# Patient Record
Sex: MALE | Race: WHITE
[De-identification: names, ages, dates, MRNs, and addresses within clinical notes are randomized per-mention and may not be internally consistent; named-entity substitution may affect disease eponyms.]

---

## 2017-02-13 ENCOUNTER — HOSPITAL ENCOUNTER (OUTPATIENT)
Dept: HOSPITAL 45 - C.RAD1850 | Age: 52
Discharge: HOME | End: 2017-02-13
Attending: FAMILY MEDICINE
Payer: COMMERCIAL

## 2017-02-13 DIAGNOSIS — M19.90: Primary | ICD-10-CM

## 2017-02-13 NOTE — DIAGNOSTIC IMAGING REPORT
RIGHT HIP 2 VIEWS



HISTORY:      Right hip pain.



COMPARISON: Right hip 3/12/2015.



FINDINGS: There is no fracture or dislocation. Soft tissues are unremarkable.

The visualized pelvic bones are intact. Cartilage spaces maintained. Minimal

osteophytosis at the right hip. This is also unchanged.



IMPRESSION:  

No fractures. No change in the minimal osteoarthritis.







Electronically signed by:  Jonah Barrientos M.D.

2/13/2017 5:03 PM



Dictated Date/Time:  2/13/2017 5:01 PM

## 2017-02-27 ENCOUNTER — HOSPITAL ENCOUNTER (OUTPATIENT)
Dept: HOSPITAL 45 - C.RAD | Age: 52
Discharge: HOME | End: 2017-02-27
Attending: INTERNAL MEDICINE
Payer: COMMERCIAL

## 2017-02-27 DIAGNOSIS — R07.9: Primary | ICD-10-CM

## 2017-02-27 NOTE — DIAGNOSTIC IMAGING REPORT
CHEST 2 VIEWS ROUTINE



CLINICAL HISTORY: Atypical chest pain    



COMPARISON STUDY:  5/5/2014



FINDINGS: The cardiac and mediastinal contours are normal. There is no evidence

of focal pulmonary consolidation. There is no evidence of failure. No pleural

effusions are visualized.[ 



IMPRESSION: No active disease in the chest.







Electronically signed by:  Octaviano Estrada M.D.

2/27/2017 2:50 PM



Dictated Date/Time:  2/27/2017 2:50 PM

## 2017-03-01 ENCOUNTER — HOSPITAL ENCOUNTER (OUTPATIENT)
Dept: HOSPITAL 45 - C.CTS | Age: 52
Discharge: HOME | End: 2017-03-01
Attending: FAMILY MEDICINE
Payer: COMMERCIAL

## 2017-03-01 DIAGNOSIS — M19.90: ICD-10-CM

## 2017-03-01 DIAGNOSIS — I77.6: Primary | ICD-10-CM

## 2017-03-01 DIAGNOSIS — N20.0: ICD-10-CM

## 2017-03-01 DIAGNOSIS — I74.8: ICD-10-CM

## 2017-03-01 NOTE — DIAGNOSTIC IMAGING REPORT
CT abdominal angiogram ANGIO ABDOMEN WITH CONTRAST



CLINICAL HISTORY: HX OF SPLENIC ARTERY THROMBOSIS celiac axis thrombosis



TECHNIQUE: Transaxial acquisition with multi axial reformatted images



COMPARISON STUDY:  MRA abdomen dated 11/12/2015 CT dated 10/26/2015



FINDINGS: No change overall compared to the prior studies. Occlusion of the

celiac axis several centimeters from its origin. At this point there is

aneurysmal distention of approximately 8 x 7 mm with a mild surrounding soft

tissue process. These all appear to be similar compared to the prior study.

Occlusion of the hepatic and splenic arteries is again noted. There again is

collateral distal reconstitution. Overall appearance is similar compared to the

prior study.



Small splenic infarct upper aspect of the spleen is unchanged. There are no new

or interval findings. Abdominal aorta is normal in course and caliber.



IMPRESSION:  

1. Stable CTA of the abdomen compared to leads to prior studies.





2. Occlusion distal celiac axis, proximal common hepatic artery, and splenic

artery with distal collateral reconstitution

3. Kidneys currently are unremarkable. Several small parapelvic cysts are

present.

4. 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

5. Nonobstructive bowel pattern. 









Electronically signed by:  Luciano Bonilla M.D.

3/1/2017 7:52 AM



Dictated Date/Time:  3/1/2017 7:39 AM

## 2017-04-28 ENCOUNTER — HOSPITAL ENCOUNTER (OUTPATIENT)
Dept: HOSPITAL 45 - C.MRIBC | Age: 52
Discharge: HOME | End: 2017-04-28
Attending: ORTHOPAEDIC SURGERY
Payer: COMMERCIAL

## 2017-04-28 DIAGNOSIS — M87.351: Primary | ICD-10-CM

## 2017-04-28 NOTE — DIAGNOSTIC IMAGING REPORT
POST ARTHROGRAM MRI THE RIGHT HIP



CLINICAL HISTORY: Right hip pain unresponsive to physical therapy and cortisone

injection.



COMPARISON STUDY:  Conventional radiographic study dated 2/13/2017



FINDINGS: There are no areas of marrow edema to indicate neoplasm. There is a

serpiginous focus of increased T2 signal within the superior cortex of the

femoral head consistent with avascular necrosis. There is no evidence of

chondral collapse.



There is no evidence of labral tear.



There is no evidence of occult fracture.



On the  images of the pelvis, there is equivocal AVN of the left hip as

well.



IMPRESSION:  Avascular necrosis of the femoral head. No evidence of chondral

collapse. 









Electronically signed by:  Octaviano Estrada M.D.

4/28/2017 11:57 AM



Dictated Date/Time:  4/28/2017 11:53 AM

## 2017-04-28 NOTE — DIAGNOSTIC IMAGING REPORT
FLUOROSCOPIC GUIDED RIGHT HIP ARTHROGRAM



FLUOROSCOPY TIME: 4 seconds.



HISTORY: Right hip pain..



PROCEDURE: After obtaining written informed consent, the patient was placed

supine on the fluoroscopy table. A suitable site for needle insertion was marked

using fluoroscopic guidance. The right hip was prepped and draped in the usual

sterile fashion. 1% lidocaine was used for skin, subcutaneous and deep soft

tissue anesthesia. Under intermittent fluoroscopic guidance, a 22 gauge 3.5 inch

spinal needle was inserted into the right hip joint. A total of 12 cc of

one-to-one mixture of dilute Magnevist (0.1 cc in 10 cc saline) and Optiray 300

were injected. The needle was then removed. There were no apparent

complications. The patient was transported to  for further imaging.



IMPRESSION: Fluoroscopic-guided right hip arthrogram without immediate

complication. Total injected volume was 12 cc. MR portion of the examination

will be dictated separately.







Electronically signed by:  Jonah Barrientos M.D.

4/28/2017 11:33 AM



Dictated Date/Time:  4/28/2017 11:32 AM

## 2017-05-08 ENCOUNTER — HOSPITAL ENCOUNTER (OUTPATIENT)
Dept: HOSPITAL 45 - C.RDSM | Age: 52
Discharge: HOME | End: 2017-05-08
Attending: ORTHOPAEDIC SURGERY
Payer: COMMERCIAL

## 2017-05-08 DIAGNOSIS — M25.551: Primary | ICD-10-CM

## 2017-05-26 ENCOUNTER — HOSPITAL ENCOUNTER (OUTPATIENT)
Dept: HOSPITAL 45 - C.MRI | Age: 52
End: 2017-05-26
Attending: ORTHOPAEDIC SURGERY
Payer: COMMERCIAL

## 2017-05-26 DIAGNOSIS — M87.052: Primary | ICD-10-CM

## 2017-05-26 NOTE — DIAGNOSTIC IMAGING REPORT
PELVIS/LEFT HIP MRI



HISTORY: Bilateral hip pain  LT HIP AVASCULAR NECROSIS



TECHNIQUE: Multiplanar multisequence MRI of the pelvis/left hip were performed

without the use of intravenous contrast.



COMPARISON STUDY:  Right hip MRI 4/28/2017.



FINDINGS: There are nearly symmetric areas of serpiginous signal abnormality

within the superior aspect of the femoral heads consistent with avascular

necrosis. No evidence for femoral head collapse at this time. No dislocation.

Normal marrow signal intensity seen throughout the visualized pelvic bones. No

significant hip effusion. Soft tissues are unremarkable. Both areas of avascular

necrosis measure approximately 3 cm in diameter.



IMPRESSION:  

Avascular necrosis of the bilateral femoral heads. No evidence for femoral head

collapse at this time. 







Electronically signed by:  Jonah Barrientos M.D.

5/26/2017 4:07 PM



Dictated Date/Time:  5/26/2017 4:03 PM

## 2017-06-02 LAB
ANION GAP SERPL CALC-SCNC: 7 MMOL/L (ref 3–11)
APPEARANCE UR: CLEAR
BASOPHILS # BLD: 0.03 K/UL (ref 0–0.2)
BASOPHILS NFR BLD: 0.4 %
BILIRUB UR-MCNC: (no result) MG/DL
BUN SERPL-MCNC: 21 MG/DL (ref 7–18)
BUN/CREAT SERPL: 20.7 (ref 10–20)
CALCIUM SERPL-MCNC: 8.7 MG/DL (ref 8.5–10.1)
CHLORIDE SERPL-SCNC: 108 MMOL/L (ref 98–107)
CO2 SERPL-SCNC: 28 MMOL/L (ref 21–32)
COLOR UR: YELLOW
COMPLETE: YES
CREAT CL PREDICTED SERPL C-G-VRATE: 90.9 ML/MIN
CREAT SERPL-MCNC: 1 MG/DL (ref 0.6–1.4)
EOSINOPHIL NFR BLD AUTO: 249 K/UL (ref 130–400)
GLUCOSE SERPL-MCNC: 85 MG/DL (ref 70–99)
HCT VFR BLD CALC: 47.1 % (ref 42–52)
IG%: 0.1 %
IMM GRANULOCYTES NFR BLD AUTO: 36.5 %
INR PPP: 1 (ref 0.9–1.1)
LYMPHOCYTES # BLD: 2.88 K/UL (ref 1.2–3.4)
MANUAL MICROSCOPIC REQUIRED?: NO
MCH RBC QN AUTO: 30.6 PG (ref 25–34)
MCHC RBC AUTO-ENTMCNC: 32.9 G/DL (ref 32–36)
MCV RBC AUTO: 93.1 FL (ref 80–100)
MONOCYTES NFR BLD: 6.6 %
NEUTROPHILS # BLD AUTO: 1 %
NEUTROPHILS NFR BLD AUTO: 55.4 %
NITRITE UR QL STRIP: (no result)
PARTIAL THROMBOPLASTIN RATIO: 0.9
PH UR STRIP: 5 [PH] (ref 4.5–7.5)
PMV BLD AUTO: 9.3 FL (ref 7.4–10.4)
POTASSIUM SERPL-SCNC: 4.2 MMOL/L (ref 3.5–5.1)
PROTHROMBIN TIME: 10.3 SECONDS (ref 9–12)
RBC # BLD AUTO: 5.06 M/UL (ref 4.7–6.1)
REVIEW REQ?: NO
SODIUM SERPL-SCNC: 143 MMOL/L (ref 136–145)
SP GR UR STRIP: 1.02 (ref 1–1.03)
URINE BILL WITH OR WITHOUT MIC: (no result)
URINE EPITHELIAL CELL AUTO: (no result) /LPF (ref 0–5)
UROBILINOGEN UR-MCNC: (no result) MG/DL
WBC # BLD AUTO: 7.88 K/UL (ref 4.8–10.8)
ZZUR CULT IF INDIC CLEAN CATCH: NO

## 2017-06-02 NOTE — PAT MEDICATION INSTRUCTIONS
Service Date


Jun 2, 2017.





Current Home Medication List


Acetaminophen Tab (Tylenol), 650 MG PO Q4H PRN for Pain


Aspirin (Aspirin Ec), 81 MG PO QPM


Bupropion (Wellbutrin Sr), 150 MG PO QAM


Ibuprofen Tab (Advil), 800 MG PO PRN


Rizatriptan Benzoate (Maxalt), 10 MG PO UD PRN for RN





Medication Instructions


For Your Scheduled Surgery 





- Hold the following medications 5 days prior to surgery per surgeon's 

instructions:


Ibuprofen Tab (Advil), 800 MG PO PRN








- Take the following medications the morning of surgery with a sip of water 

OTHERWISE NOTHING TO EAT OR DRINK AFTER MIDNIGHT:


Bupropion (Wellbutrin Sr), 150 MG PO QAM


Acetaminophen Tab (Tylenol), 650 MG PO Q4H PRN for Pain


Rizatriptan Benzoate (Maxalt), 10 MG PO UD PRN











- Take the following medications as scheduled the night before surgery:


Aspirin (Aspirin Ec), 81 MG PO QPM


Acetaminophen Tab (Tylenol), 650 MG PO Q4H PRN for Pain


Rizatriptan Benzoate (Maxalt), 10 MG PO UD PRN











If you have any questions please call us at [phone] or [phone] or 

[phone]

## 2017-06-07 NOTE — HISTORY & PHYSICAL EXAMINATION
DATE OF ADMISSION:  06/28/2017

 

CHIEF COMPLAINT:  Right hip pain.

 

HISTORY OF PRESENT ILLNESS:  This 51-year-old white male presents to the

office with complaints of right hip pain that he has had for the last 10

months.  Symptoms started after being diagnosed with a clot.  MRI of the hips

was obtained confirming AVN of the right hip.  Pain is worse with

weightbearing and ambulation.  It is affecting his ADLs.  No numbness or

tingling.  He has tried activity modification without success.  He elects to

proceed with surgical intervention in hopes of alleviating his pain.  He did

try a cortisone injection without much improvement.  He has also tried

activity modification as well as physical therapy and oral

anti-inflammatories without success.  He was taking prednisone just prior to

the onset of hip pain as well.

 

PAST MEDICAL HISTORY:  Significant for AVN of the hips as well as previous

DVT.  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  Also,

history of migraine headaches,  lumbar disc disease, and chronic steroid use

last year.  He is no longer taking those.  History of vasculitis and

nephrolithiasis.

 

PREVIOUS SURGERIES:  Vasectomy and tonsillectomy.

 

ALLERGIES:  KNOWN ALLERGY TO CIPRO WHICH CAUSES ANAPHYLACTIC SHOCK.

 

CURRENT MEDICATIONS:  Wellbutrin  mg daily, aspirin 81 mg daily, Maxalt

10 mg p.o. p.r.n. for headaches, unknown statin medication taken daily.

 

FAMILY HISTORY:  Significant for diabetes and Parkinson's disease.

 

SOCIAL HISTORY:  The patient is employed at the [employer] in [place].  He

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  No cigarette use. 

He does state that he smokes a pipe approximately 3 times per year. 

Occasional ETOH use.  .

 

REVIEW OF SYSTEMS:  Significant for above stated conditions, otherwise

unremarkable.

 

PHYSICAL EXAMINATION:

GENERAL:  Well-developed, well-nourished middle aged white male in no acute

distress.  Sitting on a bed.  Alert and oriented.

SKIN:  Warm and dry with good turgor.  No rashes or lesions.  No ecchymosis

or erythema.

HEAD, EYES, EARS, NOSE, AND THROAT:  Normocephalic, atraumatic.  Eyes PERRLA,

EOMI.  Nares patent bilaterally without turbinate enlargement.  Oropharynx

without erythema or exudate.  No lesions noted.  Uvula midline.  Oral mucosa

moist.  Fair dentition.  Fillings are noted.

HEART:  RRR.  No MGR.

LUNGS:  Clear to auscultation bilaterally.  No crackles, rhonchi or wheezing.

 Good air movement.

ABDOMEN:  Bowel sounds present x4, soft, nontender.  No organomegaly.  No

masses.

MUSCULOSKELETAL:  Right hip has no obvious asymmetry or deformity.  There is

pain associated with hip motion.  He actually has fairly good internal and

external rotation as well as hip flexion, though these cause pain.  No

discomfort with palpation over the greater trochanter or anterior thigh.  He

does have some mild discomfort over the anterior flexion crease with

palpation.  Ambulatory with a mild limp.

NEUROLOGIC:  Gross sensation is intact across the lower extremities via  soft

touch.  Cranial nerves II-XII are intact.  Peripheral pulses are 2+.

 

DATA:  MRI previously obtained shows AVN of the right hip with approximately

3 cm area of disease and impaction.

 

IMPRESSION:  Right hip avascular necrosis.

 

PLAN:  Informed written consent was obtained to proceed with right total hip

arthroplasty.  Preoperative lab work, EKG, and chest x-ray have been ordered.

 Medical clearance has been requested from his hematologist as well as his

PCP, Dr. Daniel.  The patient elects to use crutches postoperatively. 

These have been provided.  He will likely use 2 weeks of home health services

and then outpatient PT.  Postoperative prescriptions for Percocet and

Coumadin will be provided at discharge from the hospital.

 

 

 

JEFFREY

## 2017-06-28 ENCOUNTER — HOSPITAL ENCOUNTER (INPATIENT)
Dept: HOSPITAL 45 - C.ACU | Age: 52
LOS: 1 days | Discharge: HOME HEALTH | DRG: 470 | End: 2017-06-29
Attending: ORTHOPAEDIC SURGERY | Admitting: ORTHOPAEDIC SURGERY
Payer: COMMERCIAL

## 2017-06-28 VITALS
HEIGHT: 71 IN | BODY MASS INDEX: 27.56 KG/M2 | HEIGHT: 71 IN | WEIGHT: 196.87 LBS | WEIGHT: 196.87 LBS | BODY MASS INDEX: 27.56 KG/M2

## 2017-06-28 VITALS
DIASTOLIC BLOOD PRESSURE: 64 MMHG | OXYGEN SATURATION: 98 % | HEART RATE: 73 BPM | TEMPERATURE: 98.6 F | SYSTOLIC BLOOD PRESSURE: 107 MMHG

## 2017-06-28 VITALS
OXYGEN SATURATION: 96 % | TEMPERATURE: 97.7 F | DIASTOLIC BLOOD PRESSURE: 70 MMHG | SYSTOLIC BLOOD PRESSURE: 108 MMHG | HEART RATE: 76 BPM

## 2017-06-28 VITALS
SYSTOLIC BLOOD PRESSURE: 115 MMHG | HEART RATE: 72 BPM | TEMPERATURE: 97.88 F | DIASTOLIC BLOOD PRESSURE: 72 MMHG | OXYGEN SATURATION: 95 %

## 2017-06-28 VITALS — DIASTOLIC BLOOD PRESSURE: 76 MMHG | HEART RATE: 62 BPM | SYSTOLIC BLOOD PRESSURE: 118 MMHG | OXYGEN SATURATION: 100 %

## 2017-06-28 VITALS — OXYGEN SATURATION: 99 % | HEART RATE: 71 BPM | DIASTOLIC BLOOD PRESSURE: 77 MMHG | SYSTOLIC BLOOD PRESSURE: 120 MMHG

## 2017-06-28 VITALS
TEMPERATURE: 97.34 F | DIASTOLIC BLOOD PRESSURE: 69 MMHG | OXYGEN SATURATION: 100 % | HEART RATE: 61 BPM | SYSTOLIC BLOOD PRESSURE: 112 MMHG

## 2017-06-28 VITALS — OXYGEN SATURATION: 99 % | DIASTOLIC BLOOD PRESSURE: 88 MMHG | HEART RATE: 71 BPM | SYSTOLIC BLOOD PRESSURE: 131 MMHG

## 2017-06-28 VITALS — OXYGEN SATURATION: 98 % | DIASTOLIC BLOOD PRESSURE: 68 MMHG | HEART RATE: 73 BPM | SYSTOLIC BLOOD PRESSURE: 112 MMHG

## 2017-06-28 VITALS
HEART RATE: 82 BPM | TEMPERATURE: 98.24 F | SYSTOLIC BLOOD PRESSURE: 149 MMHG | DIASTOLIC BLOOD PRESSURE: 101 MMHG | OXYGEN SATURATION: 97 %

## 2017-06-28 DIAGNOSIS — E78.5: ICD-10-CM

## 2017-06-28 DIAGNOSIS — Z79.82: ICD-10-CM

## 2017-06-28 DIAGNOSIS — G43.909: ICD-10-CM

## 2017-06-28 DIAGNOSIS — Z86.718: ICD-10-CM

## 2017-06-28 DIAGNOSIS — Z79.899: ICD-10-CM

## 2017-06-28 DIAGNOSIS — M87.9: Primary | ICD-10-CM

## 2017-06-28 PROCEDURE — 0SR90JA REPLACEMENT OF RIGHT HIP JOINT WITH SYNTHETIC SUBSTITUTE, UNCEMENTED, OPEN APPROACH: ICD-10-PCS | Performed by: ORTHOPAEDIC SURGERY

## 2017-06-28 RX ADMIN — MORPHINE SULFATE PRN MG: 4 INJECTION, SOLUTION INTRAMUSCULAR; INTRAVENOUS at 11:08

## 2017-06-28 RX ADMIN — ACETAMINOPHEN SCH MLS/HR: 10 INJECTION, SOLUTION INTRAVENOUS at 19:48

## 2017-06-28 RX ADMIN — ACETAMINOPHEN SCH MLS/HR: 10 INJECTION, SOLUTION INTRAVENOUS at 11:39

## 2017-06-28 RX ADMIN — OXYCODONE HYDROCHLORIDE PRN MG: 5 TABLET ORAL at 14:33

## 2017-06-28 RX ADMIN — MORPHINE SULFATE PRN MG: 4 INJECTION, SOLUTION INTRAMUSCULAR; INTRAVENOUS at 22:17

## 2017-06-28 RX ADMIN — SODIUM CHLORIDE SCH MG: 9 INJECTION INTRAMUSCULAR; INTRAVENOUS; SUBCUTANEOUS at 18:29

## 2017-06-28 RX ADMIN — MORPHINE SULFATE PRN MG: 4 INJECTION, SOLUTION INTRAMUSCULAR; INTRAVENOUS at 16:53

## 2017-06-28 RX ADMIN — FERROUS GLUCONATE SCH MG: 324 TABLET ORAL at 18:28

## 2017-06-28 RX ADMIN — OXYCODONE HYDROCHLORIDE PRN MG: 5 TABLET ORAL at 10:41

## 2017-06-28 RX ADMIN — FERROUS GLUCONATE SCH MG: 324 TABLET ORAL at 12:34

## 2017-06-28 RX ADMIN — DOCUSATE SODIUM SCH MG: 100 CAPSULE, LIQUID FILLED ORAL at 20:36

## 2017-06-28 RX ADMIN — CEFAZOLIN SCH MLS/HR: 10 INJECTION, POWDER, FOR SOLUTION INTRAVENOUS at 16:45

## 2017-06-28 RX ADMIN — Medication SCH EA: at 12:59

## 2017-06-28 RX ADMIN — SODIUM CHLORIDE SCH MG: 9 INJECTION INTRAMUSCULAR; INTRAVENOUS; SUBCUTANEOUS at 11:40

## 2017-06-28 NOTE — [facility] OPERATIVE REPORT
Operative Report


Operative Date


Jun 28, 2017.





Pre-Operative Diagnosis





Right Hip Avascular Necrosis





Post-Operative Diagnosis


same





Procedure(s) Performed


noncemented Right ROYAL





Surgeon


Dr. Mason





Assistant Surgeon(s)


ZINA Strong





Estimated Blood Loss


100 ml





Findings


non collapsed AVN





Fluids


1800cc





Specimens





A. Right Femoral Head





Drains


none





Anesthesia


spinal





Complication(s)


None





Disposition


Recovery Room / PACU





Indications


AVN with pain





Description of Procedure


non cemented R ROYAL posterior  approach no issues on exposure dictation system 

down; I cannot type well this is abbreviated.size 5 standard stem on the femur 

with a 52 gription  cup with  screws 6.5x20 mm x 2 screws. neutral liner size 

52. blood loss 100cc fluids 1800cc.  no issues with exposure or procedure. no 

immediate complications.


I attest to the content of the Intraoperative Record and any orders documented 

therein.  Any exceptions are noted below.

## 2017-06-28 NOTE — ANESTHESIOLOGY PROGRESS NOTE
Anesthesia Post Op Note


Date & Time


Jun 28, 2017 at 09:00





Vital Signs


Pain Intensity:  0





Vital Signs Past 12 Hours








  Date Time  Temp Pulse Resp B/P (MAP) Pulse Ox O2 Delivery O2 Flow Rate FiO2


 


6/28/17 08:45  71 20 123/78 99 Nasal Cannula 2 


 


6/28/17 08:35 36.2 68 16 107/80 98 Nasal Cannula 2 


 


6/28/17 05:43 36.8 82 18 149/101 97 Room Air  











Notes


Mental Status:  alert / awake / arousable, participated in evaluation


Pt Amnestic to Procedure:  Yes


Nausea / Vomiting:  adequately controlled


Pain:  adequately controlled


Airway Patency, RR, SpO2:  stable & adequate


BP & HR:  stable & adequate


Hydration State:  stable & adequate


Neuraxial Anesthesia:  was administered, sensory block is resolving


Anesthetic Complications:  no major complications apparent

## 2017-06-28 NOTE — [facility] POST OPERATIVE BRIEF NOTE
Immediate Operative Summary


Operative Date


Jun 28, 2017.





Pre-Operative Diagnosis





Right Hip Avascular Necrosis





Post-Operative Diagnosis





Right Hip Avascular Necrosis





Procedure(s) Performed





Right Total Hip Arthroplasty--Uncemented





Surgeon


Dr. Mason





Assistant Surgeon(s)


ZINA Strong





Estimated Blood Loss


100 ml





Findings


noncollapsed avn





Fluids (cc crystalloids)


1800cc





Specimens





A. Right Femoral Head





Drains


none





Anesthesia


spinal





Complication(s)


None





Disposition


Recovery Room / PACU

## 2017-06-28 NOTE — DIAGNOSTIC IMAGING REPORT
SINGLE VIEW PELVIS



CLINICAL HISTORY: Postoperative examination.



FINDINGS: An AP portable pelvic radiograph is compared to study dated 5/8/2017.

Correlation is made with MRI of the left hip dated 5/26/2017. The skeletal

structures are well mineralized. A bipolar right hip arthroplasty is in near

anatomic alignment. 2 cortical lag screws transfix the acetabular cup. Subtle

sclerosis is is again seen in the left femoral head, likely corresponding to

avascular necrosis seen by MRI. The sacroiliac joints are normal in appearance.

Large pelvic phleboliths are observed. Surgical clips project over the scrotum

bilaterally. There are expected postoperative changes overlying the right hip

including skin clips, subcutaneous gas, and soft tissue swelling.



IMPRESSION:



1. Expected postoperative findings status post right hip arthroplasty. The

arthroplasty is in near-anatomic alignment and no acute fracture is seen.



2. Subtle changes of avascular necrosis are seen in the left femoral head.







Electronically signed by:  Favian Minor M.D.

6/28/2017 9:17 AM



Dictated Date/Time:  6/28/2017 9:14 AM

## 2017-06-28 NOTE — ORTHOPEDIC PROGRESS NOTE
Orthopedic Progress Note


Date of Service


Jun 28, 2017.





Subjective


Post OP Day:  same day


Reports: feeling well, Denies: complaints, chest pain, SOB, nausea / vomiting, 

light headedness, calf pain, pain controlled w PO medications, using PCA





Objective


calves soft nontender, N/V intact, hip located, capillary refill less than 2 

sec., dressing C/D/I, A&O x3, toes mobile, CMS intact











  Date Time  Temp Pulse Resp B/P (MAP) Pulse Ox O2 Delivery O2 Flow Rate FiO2


 


6/28/17 10:30  71 16 131/88 (102) 99   


 


6/28/17 10:00  62 16 118/76 (90) 100   


 


6/28/17 09:30 36.3 61 16 112/69 (83) 100 Nasal Cannula 2.0 


 


6/28/17 09:30     100 Nasal Cannula 2.0 


 


6/28/17 09:30      Nasal Cannula  


 


6/28/17 09:15  65 16 116/72 99 Nasal Cannula 2 


 


6/28/17 09:05 36.1 67 18 108/74 100 Nasal Cannula 2 


 


6/28/17 08:55  66 20 131/92 99 Nasal Cannula 2 


 


6/28/17 08:45  71 20 123/78 99 Nasal Cannula 2 


 


6/28/17 08:35 36.2 68 16 107/80 98 Nasal Cannula 2 


 


6/28/17 05:43 36.8 82 18 149/101 97 Room Air  








Laboratory Results 24 Hours:


doing well





Assessment & Plan


Assessment:


stable xrays look good mobilize hep lock iv








Discharge Planning


Discharge Planning:  home


DVT Prophylaxis:  Coumadin, ASA


Therapy:  Physical Therapy, Occupational Therapy

## 2017-06-29 VITALS
HEART RATE: 65 BPM | TEMPERATURE: 97.88 F | OXYGEN SATURATION: 96 % | DIASTOLIC BLOOD PRESSURE: 68 MMHG | SYSTOLIC BLOOD PRESSURE: 131 MMHG

## 2017-06-29 VITALS
SYSTOLIC BLOOD PRESSURE: 131 MMHG | OXYGEN SATURATION: 96 % | TEMPERATURE: 97.88 F | DIASTOLIC BLOOD PRESSURE: 68 MMHG | HEART RATE: 65 BPM

## 2017-06-29 VITALS
DIASTOLIC BLOOD PRESSURE: 65 MMHG | HEART RATE: 78 BPM | TEMPERATURE: 98.06 F | SYSTOLIC BLOOD PRESSURE: 102 MMHG | OXYGEN SATURATION: 97 %

## 2017-06-29 LAB
ANION GAP SERPL CALC-SCNC: 8 MMOL/L (ref 3–11)
BASOPHILS # BLD: 0.01 K/UL (ref 0–0.2)
BASOPHILS NFR BLD: 0.1 %
BUN SERPL-MCNC: 11 MG/DL (ref 7–18)
BUN/CREAT SERPL: 11.5 (ref 10–20)
CALCIUM SERPL-MCNC: 8.3 MG/DL (ref 8.5–10.1)
CHLORIDE SERPL-SCNC: 107 MMOL/L (ref 98–107)
CO2 SERPL-SCNC: 24 MMOL/L (ref 21–32)
COMPLETE: YES
CREAT CL PREDICTED SERPL C-G-VRATE: 92.8 ML/MIN
CREAT SERPL-MCNC: 0.98 MG/DL (ref 0.6–1.4)
EOSINOPHIL NFR BLD AUTO: 219 K/UL (ref 130–400)
GLUCOSE SERPL-MCNC: 111 MG/DL (ref 70–99)
HCT VFR BLD CALC: 39.2 % (ref 42–52)
IG%: 0.2 %
IMM GRANULOCYTES NFR BLD AUTO: 21.3 %
INR PPP: 1 (ref 0.9–1.1)
LYMPHOCYTES # BLD: 2.3 K/UL (ref 1.2–3.4)
MCH RBC QN AUTO: 31.2 PG (ref 25–34)
MCHC RBC AUTO-ENTMCNC: 33.9 G/DL (ref 32–36)
MCV RBC AUTO: 92 FL (ref 80–100)
MONOCYTES NFR BLD: 7.1 %
NEUTROPHILS # BLD AUTO: 0.6 %
NEUTROPHILS NFR BLD AUTO: 70.7 %
PMV BLD AUTO: 9.2 FL (ref 7.4–10.4)
POTASSIUM SERPL-SCNC: 4 MMOL/L (ref 3.5–5.1)
PROTHROMBIN TIME: 11 SECONDS (ref 9–12)
RBC # BLD AUTO: 4.26 M/UL (ref 4.7–6.1)
SODIUM SERPL-SCNC: 139 MMOL/L (ref 136–145)
WBC # BLD AUTO: 10.79 K/UL (ref 4.8–10.8)

## 2017-06-29 RX ADMIN — Medication SCH EA: at 07:18

## 2017-06-29 RX ADMIN — ACETAMINOPHEN SCH MLS/HR: 10 INJECTION, SOLUTION INTRAVENOUS at 04:05

## 2017-06-29 RX ADMIN — MORPHINE SULFATE PRN MG: 4 INJECTION, SOLUTION INTRAMUSCULAR; INTRAVENOUS at 04:28

## 2017-06-29 RX ADMIN — FERROUS GLUCONATE SCH MG: 324 TABLET ORAL at 12:30

## 2017-06-29 RX ADMIN — CEFAZOLIN SCH MLS/HR: 10 INJECTION, POWDER, FOR SOLUTION INTRAVENOUS at 00:22

## 2017-06-29 RX ADMIN — SODIUM CHLORIDE SCH MG: 9 INJECTION INTRAMUSCULAR; INTRAVENOUS; SUBCUTANEOUS at 00:22

## 2017-06-29 RX ADMIN — OXYCODONE HYDROCHLORIDE PRN MG: 5 TABLET ORAL at 08:59

## 2017-06-29 RX ADMIN — DOCUSATE SODIUM SCH MG: 100 CAPSULE, LIQUID FILLED ORAL at 08:59

## 2017-06-29 RX ADMIN — FERROUS GLUCONATE SCH MG: 324 TABLET ORAL at 09:00

## 2017-06-29 RX ADMIN — SODIUM CHLORIDE SCH MG: 9 INJECTION INTRAMUSCULAR; INTRAVENOUS; SUBCUTANEOUS at 05:44

## 2017-06-29 NOTE — ANESTHESIOLOGY PROGRESS NOTE
Anesthesia Post Op Note


Date & Time


Jun 29, 2017 at 10:24





Vital Signs





Vital Signs Past 12 Hours








  Date Time  Temp Pulse Resp B/P (MAP) Pulse Ox O2 Delivery O2 Flow Rate FiO2


 


6/29/17 07:20 36.6 65 16 131/68 (89) 96 Room Air  


 


6/29/17 07:20      Room Air  


 


6/29/17 03:41 36.7 78 16 102/65 (77) 97 Room Air  


 


6/28/17 23:04 37.0 73 16 107/64 (78) 98 Room Air  











Notes


Mental Status:  alert / awake / arousable, participated in evaluation


Pt Amnestic to Procedure:  Yes


Nausea / Vomiting:  adequately controlled


Pain:  adequately controlled


Airway Patency, RR, SpO2:  stable & adequate


BP & HR:  stable & adequate


Hydration State:  stable & adequate


Neuraxial Anesthesia:  sensory block resolved


Anesthetic Complications:  no major complications apparent

## 2017-06-29 NOTE — ORTHOPEDIC PROGRESS NOTE
Orthopedic Progress Note


Date of Service


Jun 29, 2017.





Subjective


Post OP Day:  1


Reports: feeling well, Denies: complaints, chest pain, SOB, nausea / vomiting, 

light headedness, calf pain


Additional Notes:


states he feels really good this morning. Feels he is ready to go home.





Objective


calves soft nontender, N/V intact, hip located, capillary refill less than 2 

sec., dressing C/D/I, incision C/D/I, A&O x3, toes mobile, CMS intact


moderate drainage on dressings, no active drainage.











  Date Time  Temp Pulse Resp B/P (MAP) Pulse Ox O2 Delivery O2 Flow Rate FiO2


 


6/29/17 07:20 36.6 65 16 131/68 (89) 96 Room Air  


 


6/29/17 03:41 36.7 78 16 102/65 (77) 97 Room Air  


 


6/28/17 23:04 37.0 73 16 107/64 (78) 98 Room Air  


 


6/28/17 19:59 36.6 72 16 115/72 (86) 95 Room Air  


 


6/28/17 19:40      Room Air  


 


6/28/17 16:00      Room Air  


 


6/28/17 15:09 36.5 76 16 108/70 (83) 96 Room Air  


 


6/28/17 12:30  73 16 112/68 (83) 98   


 


6/28/17 11:30  71 16 120/77 (91) 99   


 


6/28/17 10:30  71 16 131/88 (102) 99   


 


6/28/17 10:00  62 16 118/76 (90) 100   


 


6/28/17 09:30 36.3 61 16 112/69 (83) 100 Nasal Cannula 2.0 


 


6/28/17 09:30     100 Nasal Cannula 2.0 


 


6/28/17 09:30      Nasal Cannula  


 


6/28/17 09:15  65 16 116/72 99 Nasal Cannula 2 


 


6/28/17 09:05 36.1 67 18 108/74 100 Nasal Cannula 2 


 


6/28/17 08:55  66 20 131/92 99 Nasal Cannula 2 


 


6/28/17 08:45  71 20 123/78 99 Nasal Cannula 2 


 


6/28/17 08:35 36.2 68 16 107/80 98 Nasal Cannula 2 








Laboratory Results 24 Hours:











Test


  6/29/17


05:56


 


White Blood Count 10.79 K/uL 


 


Red Blood Count 4.26 M/uL 


 


Hemoglobin 13.3 g/dL 


 


Hematocrit 39.2 % 


 


Mean Corpuscular Volume 92.0 fL 


 


Mean Corpuscular Hemoglobin 31.2 pg 


 


Mean Corpuscular Hemoglobin


Concent 33.9 g/dl 


 


 


Platelet Count 219 K/uL 


 


Mean Platelet Volume 9.2 fL 


 


Neutrophils (%) (Auto) 70.7 % 


 


Lymphocytes (%) (Auto) 21.3 % 


 


Monocytes (%) (Auto) 7.1 % 


 


Eosinophils (%) (Auto) 0.6 % 


 


Basophils (%) (Auto) 0.1 % 


 


Neutrophils # (Auto) 7.63 K/uL 


 


Lymphocytes # (Auto) 2.30 K/uL 


 


Monocytes # (Auto) 0.77 K/uL 


 


Eosinophils # (Auto) 0.06 K/uL 


 


Basophils # (Auto) 0.01 K/uL 


 


Prothromb Time International


Ratio 1.0 


 


 


Prothrombin Time 11.0 SECONDS 











Assessment & Plan


Assessment:


Right hip post op day 1 total hip arthroplasty


Plan:


dressing changed by me this morning


total hip precautions reviewed.


D/C to home today with home health


coumadin per nomogram today


WBAT using walker/crutches








Discharge Planning


Discharge Planning:  home with home health


Pain Management:  Percocet


DVT Prophylaxis:  TEDs, SCDs, Coumadin, ASA


Therapy:  Physical Therapy

## 2017-06-29 NOTE — DISCHARGE SUMMARY
Orthopedic Discharge Summary


Admission Date/Reason


Jun 28, 2017 at 06:15


Right Hip Avascular Necrosis.





Discharge Date/Disposition


Jun 29, 2017


Home with services





Diagnosis


Principal Diagnosis:


AVN R hip s/p R ROYAL


Secondary Diagnoses/Problems:


hx clotting





Procedure(s) Performed


R ROYAL





Consultations


none





Medication Reconciliation


New Medications:  


Oxycodone/Acetaminophen 5MG/325MG (Percocet 5MG/325MG)  Tab


1-2 TABLETS PO Q4H PRN for Pain, #30 TAB





Warfarin Sodium (Coumadin) 2 Mg Tab


4 MG PO DAILY, #60 TAB





 


Continued Medications:  


Acetaminophen Tab (Tylenol) 325 Mg Tab


650 MG PO Q4H PRN for Pain, TAB





Aspirin (Aspirin Ec) 81 Mg Tab


81 MG PO QPM





Bupropion (Wellbutrin Sr) 150 Mg Ertab


150 MG PO QAM, TAB





Rizatriptan Benzoate (Maxalt) 10 Mg Tab


10 MG PO UD PRN for RN, TAB





 


Discontinued Medications:  


Hydrocodon/Acetaminophen 5MG/300MG (Vicodin (5MG/300MG)) 1 Tab Tab


1 TAB PO Q6H PRN for Pain, TAB





Ibuprofen Tab (Advil) 200 Mg Tab


800 MG PO PRN, TAB








take 4 mg coumadin daily starting saturday check INR monday morning 

.....fridays dose should have been taken in the hospital





Admission Physical Exam


home health


As per Admitting History & Physical.





Hospital Course


uneventful





Discharge Instructions


Please refer to the electronic Patient Visit Report (Discharge Instructions) 

for additional information.

## 2017-06-29 NOTE — DISCHARGE INSTRUCTIONS
Discharge Instructions


Date of Service


2017.





Admission


Reason for Admission:  Right Hip Avascular Necrosis





Discharge


Discharge Diagnosis / Problem:  Right hip s/p total hip replacement





Discharge Goals


Goal(s):  Decrease discomfort, Improve function, Increase independence





Activity Recommendations


Activity Limitations:  as noted below


Lifting Limitations:  gradually increase as tolerated


Exercise/Sports Limitations:  until after follow-up appointment


Shower/Bathe:  keep incision dry


Driving or Machine Use:  No driving until cleared by Dr. Mason


Weightbearing Status:  Right weightbearing (as tolerated)





.





Instructions / Follow-Up


Instructions / Follow-Up





New Medicine:  





*  You will likely be taking one or more of these medicines:


   1.  Percocet - Take, as directed, when you need it, every


        four to six hours to control your pain.


   2.  Coumadin - Thins your blood to lessen the chance of forming a


        blood clot.  The dose of this is different for each person and is based


        on your blood tests that are done twice a week.





*  The most common side effects of pain medicine and iron are nausea and 

constipation.


    If nausea or constipation is too much of a problem or if you have any 

questions about


    your new medicines or doses, call [facility] at [phone].  

We


    will try to help you manage these issues.








VERY IMPORTANT TO READ AND REVIEW"





Blood Clots and Blood Thinning Medicine:





*  You are given Coumadin during the immediate post-operative period to lessen 

the risk of


    blood clots forming in your legs and/or lungs.  Coumadin is usually given 

for six weeks after


    surgery.


*  The prescription is for 2 mg tablets.  At discharge, you should understand 

your dose and 


    take it all at the same time every day, preferably after dinner. 


*  You need to get your blood checked 1 - 2 times per week for six weeks, or as 

directed.  


*  If your dose needs to change, we will call you. Do not take your medication 

on the day of the blood 


    test until we call you.


*  If you don't hear from us after your blood draws, keep taking the same dose.





Pain:





*  The immediate post-operative period after hip replacement surgery is often 

quite painful.


*  You are given a prescription for pain medicine.  You should take it, as 

directed, when you 


    need it, especially before physical therapy and before going to bed.  Pain 

that interferes


    with sleep is very common and can last several months.


*  You will likely need pain medicine for the first two to four weeks.  It will 

not stop all of the


    pain.  The pain will lessen and as you feel better, you may change to 

milder pain medicine


    such as Tylenol.  


*  The most common side effects of pain medicine are nausea and constipation, 

so don't take


    more than you need.





Physical Therapy:


 


*  Follow the "Hip Precautions Instructions."  


*  In some cases, the  at the hospital will arrange to have a 

therapist 


    come to your house for the first couple of weeks to help you learn these 

skills.


*  You need to practice on your own or with the help of a family member as 

needed.


*  When you learn these skills, most of the therapy can be done on your own.





Home Exercise:





*  You were shown a series of exercises in the hospital.  Do these exercises 

three to 


    four times each day including the exercises you were shown in physical 

therapy.





Walking:





*  Get up and walk several times each day.  For the first four weeks, try not 

to stand or


    walk for more than one hour at a time.  If you do stand or walk for more 

than one hour,


    you will not hurt anything, but your leg will likely swell.  


*  As you feel comfortable, you may change from the walker or crutches to a 

cane and 


    then to independent walking.








SELF CARE INSTRUCTIONS AFTER TOTAL HIP REPLACEMENT





Until the incision and soft tissues around your hip have healed, there is


a possibility that the hip prosthesis could dislocate.





A.  Observe the following precautions to prevent dislocation:


   1.  Don't bend your hip greater than 90 degrees.


   2.  Avoid crossing your legs or ankles while standing or lying.


   3.  Sit with your feet placed 6 inches apart.


   4.  When sitting, keep your knees below your hips.  Sit on a firm 


        surface, avoid deep, soft chairs and couches.  Use an elevated


        toilet seat in the bathroom.


   5.  Don't bend over at the waist.  Use a long handled shoehorn and


        a sock aid to help you put on your shoes and socks.  A reacher


        can help you  objects that are too high or too low to reach.


   6.  Keep car riding to a minimum for at least one month after surgery.





B.  Your balance may be shaky for a while.  Use crutches or a walker until 


     directed by your doctor.





C.  Use hand rails when walking on stairs.





D.  Wear low heeled shoes with non-slip soles.





E.  Be sure that your floors are free of things that could trip you - throw rugs

,


     electrical cords, small objects.  Avoid wet and waxed floors, especially 

with


     crutches and canes.





F.  Try to walk several times a day with rest periods between.





G.  Continue with all the exercises taught to you in the hospital.  Again, make 


     walking a part of your daily routine.





**VERY IMPORTANT TO READ AND REVIEW**





A.  Take Coumadin, or Lovenox (blood thinning medications) as directed by your


     doctor.  If you are on Coumadin, have a pro-time (blood test) drawn 

according 


     to your doctor's instructions.  This will tell the doctor how well the 

Coumadin is


     thinning your blood.





B.  There are a few signs you need to watch for after you are home.  If you 

notice


     any of the followin.  Increased severe hip pain.  Some pain is expected especially


              when you exercise.


   2.  Increased swelling in your leg or knee; pain or swelling of the


              calf muscle in either lower leg.


   3.  Any fluid drainage from the incision.


   4.  Shortness of breath or chest pain.





TEDs/Elastic Stockings:





*  The white elastic stockings help limit swelling and prevent blood clots from


    forming in your legs.  The more you wear them, the more they work.


*  Wear them for six weeks.





Prevention of Infection:





*  Take antibiotics one hour before any dental cleaning, dental work, urological


    procedure, gastrointestinal procedure or any invasive surgery in order to


    prevent your new joint from getting infected.  


*  You may get the antibiotics from the doctor performing the procedure or we 

will


    call in a prescription to the pharmacy of your choice. Call the office for 

a 


    prescription at least 2 days prior to your appointment. 





Things to Watch For:





*  Drainage from the incision site that occurs more than one week after your 

surgery.


*  Severely increased leg pain or swelling.


*  Increased redness at the incision site.


*  Fever above 101 degrees Fahrenheit.


*  Unusual chest pain or shortness of breath.


*  Unusual pain or burning with urination.





Current Hospital Diet


Patient's current hospital diet: Regular Diet





Discharge Diet


Recommended Diet:  Regular Diet





Procedures


Procedures Performed:  


Right Total Hip Arthroplasty--Uncemented





Pending Studies


Studies pending at discharge:  no





Medical Emergencies








.


Who to Call and When:





Medical Emergencies:  If at any time you feel your situation is an emergency, 

please call 911 immediately.





.





Non-Emergent Contact


Non-Emergency issues call your:  Primary Care Provider, Surgeon


Call Non-Emergent contact if:  temperature is above 101, wound has increased 

drainage, wound has increased redness, wound has increased pain, you have any 

medication questions


.








"Provider Documentation" section prepared by Umair Samuel PA-C.








.





VTE Core Measure


Inpt VTE Proph given/why not?:  Warfarin (Coumadin), T.E.D. Stockings, SCD's





[place] Drug Monitoring Program


Search Results:  no issues identified

## 2017-08-28 ENCOUNTER — HOSPITAL ENCOUNTER (OUTPATIENT)
Dept: HOSPITAL 45 - C.RDSM | Age: 52
Discharge: HOME | End: 2017-08-28
Attending: ORTHOPAEDIC SURGERY
Payer: COMMERCIAL

## 2017-08-28 DIAGNOSIS — M25.551: Primary | ICD-10-CM

## 2017-10-16 ENCOUNTER — HOSPITAL ENCOUNTER (OUTPATIENT)
Dept: HOSPITAL 45 - C.RDSM | Age: 52
Discharge: HOME | End: 2017-10-16
Attending: ORTHOPAEDIC SURGERY
Payer: COMMERCIAL

## 2017-10-16 DIAGNOSIS — Z96.649: Primary | ICD-10-CM

## 2017-12-06 ENCOUNTER — HOSPITAL ENCOUNTER (OUTPATIENT)
Dept: HOSPITAL 45 - C.RDSM | Age: 52
Discharge: HOME | End: 2017-12-06
Attending: ORTHOPAEDIC SURGERY
Payer: COMMERCIAL

## 2017-12-06 DIAGNOSIS — M87.051: Primary | ICD-10-CM

## 2017-12-08 ENCOUNTER — HOSPITAL ENCOUNTER (OUTPATIENT)
Dept: HOSPITAL 45 - C.MRIBC | Age: 52
Discharge: HOME | End: 2017-12-08
Attending: ORTHOPAEDIC SURGERY
Payer: COMMERCIAL

## 2017-12-08 DIAGNOSIS — M87.152: Primary | ICD-10-CM

## 2017-12-08 DIAGNOSIS — Z96.641: ICD-10-CM

## 2017-12-08 DIAGNOSIS — T50.905A: ICD-10-CM

## 2017-12-08 NOTE — DIAGNOSTIC IMAGING REPORT
L LOWER EXT JOINT WITHOUT



CLINICAL HISTORY: OSTEONECROSIS LT HIP    



TECHNIQUE: Multiaxial MRI



COMPARISON STUDY:  5/26/2017



FINDINGS: Interval total right hip arthroplasty. Unchanging diminished linear

foci of serpiginous signal left mid and superior femoral head. No evidence for

femoral head collapse. Articular services remain intact. Strength soft tissue

structures are unremarkable.



IMPRESSION:  

1. Osteonecrosis left superior femoral head, although the articular services and

substance of the femoral head shows no evidence for disruption or collapse.





2. Reactive bone marrow edema of the prior study is slightly diminished.





3. Interval right hip total arthroplasty. 

4. The appearance of the left femoral head must be considered slightly improved

compared to the prior study given the diminished bone marrow edematous change.









The above report was generated using voice recognition software.  It may contain

grammatical, syntax or spelling errors.







Electronically signed by:  Luciano Bonilla M.D.

12/8/2017 11:33 AM



Dictated Date/Time:  12/8/2017 11:24 AM

## 2018-07-19 ENCOUNTER — HOSPITAL ENCOUNTER (OUTPATIENT)
Dept: HOSPITAL 45 - C.MAMM | Age: 53
Discharge: HOME | End: 2018-07-19
Attending: HOSPITALIST
Payer: COMMERCIAL

## 2018-07-19 DIAGNOSIS — M85.852: Primary | ICD-10-CM

## 2018-07-19 DIAGNOSIS — Z79.52: ICD-10-CM
